# Patient Record
Sex: MALE | Race: OTHER | Employment: PART TIME | URBAN - METROPOLITAN AREA
[De-identification: names, ages, dates, MRNs, and addresses within clinical notes are randomized per-mention and may not be internally consistent; named-entity substitution may affect disease eponyms.]

---

## 2018-08-05 ENCOUNTER — OFFICE VISIT (OUTPATIENT)
Dept: FAMILY MEDICINE CLINIC | Facility: CLINIC | Age: 28
End: 2018-08-05

## 2018-08-05 VITALS
SYSTOLIC BLOOD PRESSURE: 124 MMHG | RESPIRATION RATE: 20 BRPM | OXYGEN SATURATION: 98 % | BODY MASS INDEX: 29.88 KG/M2 | HEIGHT: 64 IN | HEART RATE: 65 BPM | DIASTOLIC BLOOD PRESSURE: 80 MMHG | TEMPERATURE: 98 F | WEIGHT: 175 LBS

## 2018-08-05 DIAGNOSIS — S80.811A INFECTED ABRASION OF RIGHT LOWER EXTREMITY, INITIAL ENCOUNTER: Primary | ICD-10-CM

## 2018-08-05 DIAGNOSIS — S80.812A INFECTED ABRASION OF LEFT LOWER EXTREMITY, INITIAL ENCOUNTER: ICD-10-CM

## 2018-08-05 DIAGNOSIS — L08.9 INFECTED ABRASION OF LEFT LOWER EXTREMITY, INITIAL ENCOUNTER: ICD-10-CM

## 2018-08-05 DIAGNOSIS — L08.9 INFECTED ABRASION OF RIGHT LOWER EXTREMITY, INITIAL ENCOUNTER: Primary | ICD-10-CM

## 2018-08-05 PROCEDURE — 99202 OFFICE O/P NEW SF 15 MIN: CPT | Performed by: NURSE PRACTITIONER

## 2018-08-05 RX ORDER — CEPHALEXIN 500 MG/1
500 CAPSULE ORAL 3 TIMES DAILY
Qty: 21 CAPSULE | Refills: 0 | Status: SHIPPED | OUTPATIENT
Start: 2018-08-05 | End: 2018-08-15

## 2018-08-05 NOTE — PROGRESS NOTES
CHIEF COMPLAINT:   Patient presents with:  Laceration Abrasion (integumentary): lower right leg/right knee s/s for 3 days. OTC Oint. was used.        HPI:     Aaron Gann is a 25year old male who presents with concerns of possible infection of RL abnormal sensation, no tingling of the skin or numbness.     EXAM:   /80   Pulse 65   Temp 98.3 °F (36.8 °C) (Tympanic)   Resp 20   Ht 64\"   Wt 175 lb   SpO2 98%   BMI 30.04 kg/m²   GENERAL: well developed, well nourished,in no apparent distress  SKI sympmtoms. Monitor for healing. Return or follow-up with your PCP for increase in redness or pain, warmth, fever, red streaking, or discharge from wound. The patient indicates understanding of these issues and agrees to the plan.

## 2018-08-05 NOTE — PATIENT INSTRUCTIONS
Wash area with soap and water. Use antibiotic ointment as prescribed. Fill prescription for cephalexin if no improvement in 2 days or for any worsening sympmtoms. Monitor for healing.   Return or follow-up with your PCP for increase in redness or pain,

## 2022-08-12 PROCEDURE — 99284 EMERGENCY DEPT VISIT MOD MDM: CPT

## 2022-08-13 ENCOUNTER — APPOINTMENT (OUTPATIENT)
Dept: ULTRASOUND IMAGING | Facility: HOSPITAL | Age: 32
End: 2022-08-13
Attending: EMERGENCY MEDICINE

## 2022-08-13 ENCOUNTER — HOSPITAL ENCOUNTER (EMERGENCY)
Facility: HOSPITAL | Age: 32
Discharge: HOME | End: 2022-08-13
Attending: EMERGENCY MEDICINE

## 2022-08-13 VITALS
WEIGHT: 179 LBS | RESPIRATION RATE: 16 BRPM | TEMPERATURE: 100 F | HEART RATE: 77 BPM | SYSTOLIC BLOOD PRESSURE: 135 MMHG | HEIGHT: 63 IN | BODY MASS INDEX: 31.71 KG/M2 | DIASTOLIC BLOOD PRESSURE: 82 MMHG | OXYGEN SATURATION: 98 %

## 2022-08-13 DIAGNOSIS — L03.115 CELLULITIS OF RIGHT LEG: Primary | ICD-10-CM

## 2022-08-13 PROCEDURE — 93971 EXTREMITY STUDY: CPT | Performed by: EMERGENCY MEDICINE

## 2022-08-13 RX ORDER — CEPHALEXIN 500 MG/1
500 CAPSULE ORAL 4 TIMES DAILY
Qty: 28 CAPSULE | Refills: 0 | Status: SHIPPED | OUTPATIENT
Start: 2022-08-13 | End: 2022-08-20

## 2022-08-13 RX ORDER — HYDROCODONE BITARTRATE AND ACETAMINOPHEN 5; 325 MG/1; MG/1
1 TABLET ORAL ONCE
Status: COMPLETED | OUTPATIENT
Start: 2022-08-13 | End: 2022-08-13

## 2022-08-13 RX ORDER — HYDROCODONE BITARTRATE AND ACETAMINOPHEN 5; 325 MG/1; MG/1
1-2 TABLET ORAL EVERY 6 HOURS PRN
Qty: 10 TABLET | Refills: 0 | Status: SHIPPED | OUTPATIENT
Start: 2022-08-13 | End: 2022-08-18

## 2022-08-13 RX ORDER — HYDROCODONE BITARTRATE AND ACETAMINOPHEN 5; 325 MG/1; MG/1
1 TABLET ORAL EVERY 6 HOURS PRN
Qty: 10 TABLET | Refills: 0 | Status: SHIPPED | OUTPATIENT
Start: 2022-08-13 | End: 2022-08-20

## 2022-08-13 RX ORDER — CEPHALEXIN 500 MG/1
500 CAPSULE ORAL ONCE
Status: COMPLETED | OUTPATIENT
Start: 2022-08-13 | End: 2022-08-13

## 2022-08-13 NOTE — ED INITIAL ASSESSMENT (HPI)
Pt c/o pain/ redness and warmth to the right lower extremity x 2 days. Per family, pt has had difficulty with ambulation d/t pain. Pts family also states pt had fever today, tmax 102. Pt also c/o body aches.

## (undated) NOTE — LETTER
Date: 8/5/2018    Patient Name: Yancy Arndt          To Whom it may concern: This letter has been written at the patient's request. The above patient was seen at the [facility] for treatment of a medical condition.     This patient